# Patient Record
Sex: MALE | Race: WHITE | Employment: OTHER | URBAN - METROPOLITAN AREA
[De-identification: names, ages, dates, MRNs, and addresses within clinical notes are randomized per-mention and may not be internally consistent; named-entity substitution may affect disease eponyms.]

---

## 2024-08-18 ENCOUNTER — HOSPITAL ENCOUNTER (EMERGENCY)
Facility: CLINIC | Age: 69
Discharge: HOME | End: 2024-08-18
Attending: EMERGENCY MEDICINE
Payer: MEDICARE

## 2024-08-18 VITALS
OXYGEN SATURATION: 98 % | HEART RATE: 68 BPM | HEIGHT: 71 IN | RESPIRATION RATE: 16 BRPM | WEIGHT: 185 LBS | BODY MASS INDEX: 25.9 KG/M2 | SYSTOLIC BLOOD PRESSURE: 148 MMHG | TEMPERATURE: 98.2 F | DIASTOLIC BLOOD PRESSURE: 91 MMHG

## 2024-08-18 DIAGNOSIS — M25.552 LEFT HIP PAIN: Primary | ICD-10-CM

## 2024-08-18 PROCEDURE — 99284 EMERGENCY DEPT VISIT MOD MDM: CPT

## 2024-08-18 PROCEDURE — 6360000002 HC RX W HCPCS: Performed by: NURSE PRACTITIONER

## 2024-08-18 PROCEDURE — 6370000000 HC RX 637 (ALT 250 FOR IP): Performed by: NURSE PRACTITIONER

## 2024-08-18 PROCEDURE — 96372 THER/PROPH/DIAG INJ SC/IM: CPT

## 2024-08-18 RX ORDER — OXYCODONE HYDROCHLORIDE AND ACETAMINOPHEN 5; 325 MG/1; MG/1
1 TABLET ORAL ONCE
Status: COMPLETED | OUTPATIENT
Start: 2024-08-18 | End: 2024-08-18

## 2024-08-18 RX ORDER — TRIAMCINOLONE ACETONIDE 1 MG/G
CREAM TOPICAL 2 TIMES DAILY
COMMUNITY
Start: 2024-03-13

## 2024-08-18 RX ORDER — METHOCARBAMOL 500 MG/1
500 TABLET, FILM COATED ORAL 3 TIMES DAILY
Qty: 15 TABLET | Refills: 0 | Status: SHIPPED | OUTPATIENT
Start: 2024-08-18 | End: 2024-08-23

## 2024-08-18 RX ORDER — ALLOPURINOL 300 MG/1
300 TABLET ORAL EVERY MORNING
COMMUNITY
Start: 2024-08-09

## 2024-08-18 RX ORDER — PREDNISONE 20 MG/1
60 TABLET ORAL DAILY
Qty: 15 TABLET | Refills: 0 | Status: SHIPPED | OUTPATIENT
Start: 2024-08-18 | End: 2024-08-23

## 2024-08-18 RX ORDER — OXYCODONE HYDROCHLORIDE AND ACETAMINOPHEN 5; 325 MG/1; MG/1
1 TABLET ORAL EVERY 6 HOURS PRN
Qty: 12 TABLET | Refills: 0 | Status: SHIPPED | OUTPATIENT
Start: 2024-08-18 | End: 2024-08-18

## 2024-08-18 RX ORDER — KETOROLAC TROMETHAMINE 30 MG/ML
30 INJECTION, SOLUTION INTRAMUSCULAR; INTRAVENOUS ONCE
Status: COMPLETED | OUTPATIENT
Start: 2024-08-18 | End: 2024-08-18

## 2024-08-18 RX ORDER — OXYCODONE HYDROCHLORIDE AND ACETAMINOPHEN 5; 325 MG/1; MG/1
1 TABLET ORAL EVERY 6 HOURS PRN
Qty: 12 TABLET | Refills: 0 | Status: SHIPPED | OUTPATIENT
Start: 2024-08-18 | End: 2024-08-21

## 2024-08-18 RX ORDER — GABAPENTIN 300 MG/1
600 CAPSULE ORAL DAILY
COMMUNITY
Start: 2024-08-09

## 2024-08-18 RX ORDER — LOSARTAN POTASSIUM 100 MG/1
100 TABLET ORAL DAILY
COMMUNITY
Start: 2024-08-09

## 2024-08-18 RX ADMIN — KETOROLAC TROMETHAMINE 30 MG: 30 INJECTION, SOLUTION INTRAMUSCULAR at 13:03

## 2024-08-18 RX ADMIN — OXYCODONE HYDROCHLORIDE AND ACETAMINOPHEN 1 TABLET: 5; 325 TABLET ORAL at 13:03

## 2024-08-18 ASSESSMENT — PAIN DESCRIPTION - ORIENTATION: ORIENTATION: LEFT

## 2024-08-18 ASSESSMENT — PAIN - FUNCTIONAL ASSESSMENT: PAIN_FUNCTIONAL_ASSESSMENT: 0-10

## 2024-08-18 ASSESSMENT — PAIN DESCRIPTION - FREQUENCY: FREQUENCY: CONTINUOUS

## 2024-08-18 ASSESSMENT — PAIN SCALES - GENERAL: PAINLEVEL_OUTOF10: 8

## 2024-08-18 ASSESSMENT — PAIN DESCRIPTION - LOCATION: LOCATION: HIP

## 2024-08-18 NOTE — ED PROVIDER NOTES
[facility]  eMERGENCY dEPARTMENT eNCOUnter   Independent Attestation     Pt Name: Pro Mariscal  MRN: [id number]  Birthdate 1955  Date of evaluation: 8/18/24       Pro Mariscal is a 69 y.o. male who presents with Hip Pain (Left... denies any trauma)        Based on the medical record, the care appears appropriate. I was personally available for consultation in the Emergency Department.    Vishal Patel DO  Attending Emergency  Physician                Vishal Patel DO  08/18/24 [id number]    
08/18/24 1303   BP: (!) 175/103 (!) 148/91   Pulse: 68    Resp: 16    Temp: 98.2 °F (36.8 °C)    TempSrc: Oral    SpO2: 98%    Weight: 83.9 kg (185 lb)    Height: 1.803 m (5' 11\")            Medical Decision Making  Risk  Prescription drug management.      Patient presents for evaluation of what he describes as hip pain.  Has had an increase in his activity level over the past week and has been dealing with some sciatica and piriformis syndrome on the right symptoms had been present before this week on the left side as well but were minimal.  He does feel that his activity level has increased significantly to cause a increase in the pain on the left.  He has been trying stretches at home to no avail.  He has been trying Tylenol and Motrin at home that have not relieved the pain.  Here the patient is given an injection of Toradol Percocet for home.  We did discuss the danger of narcotics and he understands to take them sparingly.  We discussed using them only at bedtime.  He will be given a short course of prednisone to help with inflammation.  I do not feel the need to x-ray the hip as he has full range of motion and there is been no trauma.  I do not suspect occult fracture.  I do not suspect cauda equina or any injury to the back he has no midline tenderness and full range of motion to the back.  He has no significant paresthesias.  We discussed following closely with his primary care physician and orthopedic surgeon if necessary.  He is given referral to orthopedics.  We discussed what to watch out for and when to return.  He acknowledges understanding and is agreeable to discharge plan of care      REASSESSMENT     ED Course as of 08/18/24 1314   Sun Aug 18, 2024   1301 Blood pressure is elevated, patient does admit he has not taking any of his blood pressure medication today [MR]      ED Course User Index  [MR] Marva Alberts, APRN - CNP         CRITICAL CARE TIME

## 2024-08-18 NOTE — DISCHARGE INSTRUCTIONS
Home.  Prednisone as prescribed until gone.  Percocet for severe pain at bedtime only.  Do not take this in conjunction with the muscle relaxer.  Use Tylenol and Motrin as needed during the day as well as Robaxin as prescribed during the day to help manage your pain.  Follow-up with your primary care physician in the next 1 to 2 days.  Return immediately for any numbness, tingling, weakness, back pain, redness warmth swelling, fevers, or any other worsening symptoms or concerns.    Take your blood pressure medications on arrival home please :)

## 2024-08-21 ENCOUNTER — TELEPHONE (OUTPATIENT)
Dept: ORTHOPEDIC SURGERY | Age: 69
End: 2024-08-21